# Patient Record
Sex: FEMALE | Race: WHITE | HISPANIC OR LATINO | Employment: UNEMPLOYED | URBAN - METROPOLITAN AREA
[De-identification: names, ages, dates, MRNs, and addresses within clinical notes are randomized per-mention and may not be internally consistent; named-entity substitution may affect disease eponyms.]

---

## 2017-03-15 ENCOUNTER — TELEPHONE (OUTPATIENT)
Dept: OBGYN | Facility: CLINIC | Age: 27
End: 2017-03-15

## 2020-03-12 ENCOUNTER — HOSPITAL ENCOUNTER (EMERGENCY)
Facility: MEDICAL CENTER | Age: 30
End: 2020-03-12
Attending: EMERGENCY MEDICINE
Payer: OTHER GOVERNMENT

## 2020-03-12 ENCOUNTER — APPOINTMENT (OUTPATIENT)
Dept: RADIOLOGY | Facility: MEDICAL CENTER | Age: 30
End: 2020-03-12
Attending: EMERGENCY MEDICINE
Payer: OTHER GOVERNMENT

## 2020-03-12 VITALS
HEIGHT: 63 IN | SYSTOLIC BLOOD PRESSURE: 131 MMHG | WEIGHT: 191.14 LBS | OXYGEN SATURATION: 96 % | TEMPERATURE: 97.2 F | RESPIRATION RATE: 18 BRPM | DIASTOLIC BLOOD PRESSURE: 90 MMHG | BODY MASS INDEX: 33.87 KG/M2 | HEART RATE: 92 BPM

## 2020-03-12 DIAGNOSIS — J18.9 ATYPICAL PNEUMONIA: ICD-10-CM

## 2020-03-12 DIAGNOSIS — J06.9 UPPER RESPIRATORY TRACT INFECTION, UNSPECIFIED TYPE: ICD-10-CM

## 2020-03-12 LAB
C PNEUM DNA SPEC QL NAA+PROBE: NOT DETECTED
FLUAV H1 2009 PAND RNA SPEC QL NAA+PROBE: NOT DETECTED
FLUAV H1 RNA SPEC QL NAA+PROBE: NOT DETECTED
FLUAV H3 RNA SPEC QL NAA+PROBE: NOT DETECTED
FLUAV RNA SPEC QL NAA+PROBE: NEGATIVE
FLUAV RNA SPEC QL NAA+PROBE: NOT DETECTED
FLUBV RNA SPEC QL NAA+PROBE: NEGATIVE
FLUBV RNA SPEC QL NAA+PROBE: NOT DETECTED
HADV DNA SPEC QL NAA+PROBE: NOT DETECTED
HCOV RNA SPEC QL NAA+PROBE: NOT DETECTED
HMPV RNA SPEC QL NAA+PROBE: NOT DETECTED
HPIV1 RNA SPEC QL NAA+PROBE: NOT DETECTED
HPIV2 RNA SPEC QL NAA+PROBE: NOT DETECTED
HPIV3 RNA SPEC QL NAA+PROBE: NOT DETECTED
HPIV4 RNA SPEC QL NAA+PROBE: NOT DETECTED
M PNEUMO DNA SPEC QL NAA+PROBE: NOT DETECTED
RSV A RNA SPEC QL NAA+PROBE: NOT DETECTED
RSV B RNA SPEC QL NAA+PROBE: NOT DETECTED
RV+EV RNA SPEC QL NAA+PROBE: NOT DETECTED

## 2020-03-12 PROCEDURE — 87633 RESP VIRUS 12-25 TARGETS: CPT

## 2020-03-12 PROCEDURE — 700111 HCHG RX REV CODE 636 W/ 250 OVERRIDE (IP): Performed by: EMERGENCY MEDICINE

## 2020-03-12 PROCEDURE — 71045 X-RAY EXAM CHEST 1 VIEW: CPT

## 2020-03-12 PROCEDURE — 99284 EMERGENCY DEPT VISIT MOD MDM: CPT

## 2020-03-12 PROCEDURE — 87486 CHLMYD PNEUM DNA AMP PROBE: CPT

## 2020-03-12 PROCEDURE — 87502 INFLUENZA DNA AMP PROBE: CPT | Mod: XU

## 2020-03-12 PROCEDURE — 87581 M.PNEUMON DNA AMP PROBE: CPT

## 2020-03-12 RX ORDER — DOXYCYCLINE 100 MG/1
100 CAPSULE ORAL 2 TIMES DAILY
Qty: 20 CAP | Refills: 0 | Status: SHIPPED | OUTPATIENT
Start: 2020-03-12 | End: 2020-03-22

## 2020-03-12 RX ORDER — DEXAMETHASONE SODIUM PHOSPHATE 10 MG/ML
10 INJECTION, SOLUTION INTRAMUSCULAR; INTRAVENOUS ONCE
Status: COMPLETED | OUTPATIENT
Start: 2020-03-12 | End: 2020-03-12

## 2020-03-12 RX ADMIN — DEXAMETHASONE SODIUM PHOSPHATE 10 MG: 10 INJECTION INTRAMUSCULAR; INTRAVENOUS at 11:47

## 2020-03-12 NOTE — ED NOTES
Pt Given discharge instructions/ prescriptions/ home care instructions, Pt verbalized understanding of instructions given, pt ambulatory to [facility].

## 2020-03-12 NOTE — ED NOTES
"ED Positive Culture Follow-up/Notification Note:    Date: 3/12/2020     Patient seen in the ED on 3/12/2020 for flu like symptoms of fever, sore throat, and cough x 3 weeks. Denies known COVID contacts but has a boyfriend that is a  to [facility].  1. Upper respiratory tract infection, unspecified type    2. Atypical pneumonia       Discharge Medication List as of 3/12/2020 12:08 PM      START taking these medications    Details   doxycycline (MONODOX) 100 MG capsule Take 1 Cap by mouth 2 times a day for 10 days., Disp-20 Cap, R-0, Print Rx Paper             Allergies: Patient has no known allergies.     Vitals:    03/12/20 0955 03/12/20 0956 03/12/20 1148   BP: 139/86  131/90   Pulse: 85  92   Resp: 16  18   Temp: 36.6 °C (97.9 °F)  36.2 °C (97.2 °F)   TempSrc: Oral  Temporal   SpO2: 95%  96%   Weight:  86.7 kg (191 lb 2.2 oz)    Height: 1.6 m (5' 3\")         Final cultures:   Results     Procedure Component Value Units Date/Time    Influenza A/B By PCR (Adult - Flu Only) [[id number]] Collected:  03/12/20 1023    Order Status:  Completed Specimen:  Respirate from Nasopharyngeal Updated:  03/12/20 1108     Influenza virus A RNA Negative     Influenza virus B, PCR Negative    Influenza A/B By PCR (Adult - Flu Only) [[id number]] Collected:  03/12/20 1023    Order Status:  Canceled Specimen:  Respirate from Nasopharyngeal       Results for REYES, YESSICA (MRN [id number]) as of 3/12/2020 13:45   3/12/2020 10:23   Adenovirus, PCR Not Detected   Chlamydia pneumoniae, PCR Not Detected   Coronavirus, PCR Not Detected   Human Metapneumovirus, PCR Not Detected   Influenza A 2009, H1N1, PCR Not Detected   Influenza virus A RNA Not Detected   Influenza virus B, PCR Not Detected   Influenza virus A H1 RNA Not Detected   Influenza virus A H3 RNA Not Detected   Mycoplasma pneumoniae, PCR Not Detected   Parainfluenza 4, PCR Not Detected   Parainfluenza virus 1, PCR Not Detected   Parainfluenza virus 2, PCR Not " Detected   Parainfluenza virus 3, PCR Not Detected   Resp Syncytial Virus A, PCR Not Detected   Resp Syncytial Virus B, PCR Not Detected   Rhinovirus / Enterovirus, PCR Not Detected       Plan:   Influenza and Respiratory PCR panel negative. I have informed the patient of the negative results and need to continue to quarantine until directed by the Health Dept.  I have contacted Infection Prevention to inform them of the negative results. They will contact the Health Dept for further COVID-19 evaluation.       Jeanie Sher, PharmD

## 2020-03-12 NOTE — ED PROVIDER NOTES
ED Provider Note    CHIEF COMPLAINT  Chief Complaint   Patient presents with   • Flu Like Symptoms       HPI  Yessica Reyes is a 29 y.o. female who presents who presents with chief complaint of flulike symptoms.  Patient reports fever sore throat and cough for the last 3 weeks.  Patient reports a fever of 103 last night, patient has not taken any antipyretics today.  Patient works at [employer] and the [employer].  She has not left the state or come into contact with any known COVID-19 contacts.  Patient denies any other major medical problems, she denies any history of diabetes or high blood pressure.  Patient denies any associated nausea or vomiting.  Patient has not left the area but her boyfriend does work as a  and drives to [place] relatively regularly.  He has not had a fever symptoms aside from mild runny nose.    REVIEW OF SYSTEMS  ROS  See HPI for further details. All other systems are negative.     PAST MEDICAL HISTORY       SOCIAL HISTORY  Social History     Tobacco Use   • Smoking status: Never Smoker   Substance and Sexual Activity   • Alcohol use: No   • Drug use: No   • Sexual activity: Yes     Partners: Male       SURGICAL HISTORY  patient denies any surgical history    CURRENT MEDICATIONS  Home Medications     Reviewed by Schuyler B Collett, R.N. (Registered Nurse) on 03/12/20 at 1000  Med List Status: <None>   Medication Last Dose Status   hydrocodone-acetaminophen (VICODIN ES) 7.5-750 MG per tablet  Active                ALLERGIES  No Known Allergies    PHYSICAL EXAM  Physical Exam   Constitutional: She is oriented to person, place, and time. She appears well-developed and well-nourished.   HENT:   Head: Normocephalic and atraumatic.   no trismus, no facial swelling, no floor of mouth swelling or submandibular fullness, no stridor. No pharyngeal asymmetry. Nl phonation     Eyes: Conjunctivae are normal.   Neck: Normal range of motion. Neck supple.   Cardiovascular: Normal rate and regular rhythm.    Pulmonary/Chest: Effort normal and breath sounds normal.   Abdominal: Soft. Bowel sounds are normal. She exhibits no distension. There is no abdominal tenderness. There is no rebound.   Neurological: She is alert and oriented to person, place, and time.   Skin: Skin is warm and dry. No rash noted.   Psychiatric: She has a normal mood and affect. Her behavior is normal.     Results for orders placed or performed during the hospital encounter of 03/12/20   Influenza A/B By PCR (Adult - Flu Only)   Result Value Ref Range    Influenza virus A RNA Negative Negative    Influenza virus B, PCR Negative Negative         DX-CHEST-PORTABLE (1 VIEW)   Final Result      No evidence of acute cardiopulmonary process.          COURSE & MEDICAL DECISION MAKING  Pertinent Labs & Imaging studies reviewed. (See chart for details)    Patient here with symptoms consistent with possible influenza versus pneumonia.   Patient x-ray and flu is negative.  I do believe this could be an atypical pneumonia especially given the duration will treat with doxycycline for this.  Patient without any lower extremity edema, she has no crackles on exam, she has no chest pain or considerable decreased exertional capacity to suggest vascular etiology.  Per [facility] triage workflow, patient does not qualify for any further testing at this point regarding Covid 19, however her boyfriend does travel regularly to [place] and therefore a viral panel was sent and will be followed up by pharmacy.  Patient is flu negative.  She understands that if she has ongoing concerns regarding this to call the Covid information line.  Patient instructed to self quarantine until symptoms have resolved.    Patient examined with N95, goggles and gloves    The patient will return for worsening symptoms and is stable at the time of discharge. The patient verbalizes understanding and will comply.    FINAL IMPRESSION    1. Upper respiratory tract infection, unspecified type     2. Atypical pneumonia               Electronically signed by: Deyvi Richards M.D., 3/12/2020 10:04 AM

## 2020-03-12 NOTE — ED TRIAGE NOTES
Yessica Reyes presents to triage reporting fever, cough, sore throat x 3 weeks. Pt denies any recent travel outside the state. 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     Mask placed on pt at doorway. Pt speaking in full sentences, NAD noted. Pt educated of triage process, placed back in Senior waiting area pending room assignment.

## 2020-03-12 NOTE — DISCHARGE INSTRUCTIONS
Since you have had symptoms for this long that are not improved and you are having fevers we will treat you with an antibiotic for atypical pneumonia.  At this point you do not qualify for testing for COVID but we have sent off a viral panel and if this is negative we will contact you with further instructions. We remain with a low suspicion that this is a coronavirus related issue.  Nevertheless we are asking everybody with respiratory symptoms to stay home until symptoms have resolved.  Call the Covid information line [phone] if you have any further concerns regarding COVID

## 2020-03-14 LAB
SARS-COV-2 N GENE SPEC QL NAA N1: NOT DETECTED
SARS-COV-2 N GENE SPEC QL NAA N2: NOT DETECTED
SARS-COV-2 RNA RESP QL NAA+PROBE: NOT DETECTED
SPECIMEN SOURCE: NORMAL

## 2020-03-19 NOTE — ED NOTES
COVID-19 Test Follow Up 3/19/2020      Results for REYES, YESSICA (MRN [id number]) as of 3/19/2020 09:18   Ref. Range 3/12/2020 10:23   2019-nCoV RNA Interp Unknown Not detected   2019-nCoV Source Unknown NP   2019-nCoV_N1 Unknown Not detected   2019-nCoV_N2 Unknown Not detected       Patient tested negative for COVID-19. Attempted to inform patient of result, but there was no answer. Left a message to call for results. If she returns call will discuss standard precautions. Informed there is no need for further self-quarantine unless otherwise directed by the Health Dept.     Jeanie Sher, PharmD    3/19/2020 1032 Patient returned call and had been informed of results.

## 2021-12-24 ENCOUNTER — HOSPITAL ENCOUNTER (EMERGENCY)
Facility: MEDICAL CENTER | Age: 31
End: 2021-12-24
Attending: EMERGENCY MEDICINE

## 2021-12-24 VITALS
BODY MASS INDEX: 38.25 KG/M2 | TEMPERATURE: 98.7 F | DIASTOLIC BLOOD PRESSURE: 83 MMHG | SYSTOLIC BLOOD PRESSURE: 131 MMHG | HEART RATE: 100 BPM | OXYGEN SATURATION: 95 % | WEIGHT: 202.6 LBS | HEIGHT: 61 IN | RESPIRATION RATE: 18 BRPM

## 2021-12-24 DIAGNOSIS — J06.9 VIRAL URI WITH COUGH: ICD-10-CM

## 2021-12-24 LAB
SARS-COV-2 RNA RESP QL NAA+PROBE: NOTDETECTED
SPECIMEN SOURCE: NORMAL

## 2021-12-24 PROCEDURE — U0005 INFEC AGEN DETEC AMPLI PROBE: HCPCS

## 2021-12-24 PROCEDURE — 99283 EMERGENCY DEPT VISIT LOW MDM: CPT | Mod: EDC

## 2021-12-24 PROCEDURE — U0003 INFECTIOUS AGENT DETECTION BY NUCLEIC ACID (DNA OR RNA); SEVERE ACUTE RESPIRATORY SYNDROME CORONAVIRUS 2 (SARS-COV-2) (CORONAVIRUS DISEASE [COVID-19]), AMPLIFIED PROBE TECHNIQUE, MAKING USE OF HIGH THROUGHPUT TECHNOLOGIES AS DESCRIBED BY CMS-2020-01-R: HCPCS

## 2021-12-24 NOTE — ED NOTES
"Discharge instructions given to guardian re.   1. Viral URI with cough       Discussed importance of follow up and monitoring at home.    Advised to follow up with [address]  [place] [address] [phone]  Schedule an appointment as soon as possible for a visit in 2 days    Advised to return to ER if new or worsening symptoms present.  Guardian verbalized an understanding of the instructions presented, all questioned answered.      Discharge paperwork signed and a copy was give to pt/parent.   Pt awake, alert, and NAD.    /83   Pulse 100   Temp 37.1 °C (98.7 °F) (Temporal)   Resp 18   Ht 1.56 m (5' 1.42\")   Wt 91.9 kg (202 lb 9.6 oz)   LMP 12/20/2021   SpO2 95%   BMI 37.76 kg/m²      "

## 2021-12-24 NOTE — ED TRIAGE NOTES
"Chief Complaint   Patient presents with   • Cough     w/congestion x2 days   • Headache     x2 days   • Sore Throat     x2 days; redness noted     Son currently being seen in [facility]. Patient alert and oriented. Skin PWD. No apparent distress.     /73   Pulse 99   Temp 37.2 °C (99 °F) (Temporal)   Resp 18   Ht 1.56 m (5' 1.42\")   Wt 91.9 kg (202 lb 9.6 oz)   LMP 12/20/2021   SpO2 93%   BMI 37.76 kg/m²     Patient medicated at home with tylenol @1000        "

## 2021-12-24 NOTE — ED PROVIDER NOTES
ED Provider Note    CHIEF COMPLAINT  No chief complaint on file.  Cough    HPI  Hilariasica Reyes is a 31 y.o. female who presents to the emergency department with several days of cough and cold symptoms.  The patient is a runny nose, congestion, cough, headache, achiness and shortness of breath with exertion.  Her child is here in the emergency department similar symptoms.  Is been present for last 3 days.  Patient denies any chest pain or significant comorbidities.  She has been vaccinated for COVID.  Denies any other acute concerns    REVIEW OF SYSTEMS  See HPI for further details. All other systems are negative.    PAST MEDICAL HISTORY  No past medical history on file.    FAMILY HISTORY  Family History   Problem Relation Age of Onset   • Diabetes Paternal Grandmother    • Diabetes Paternal Grandfather        SOCIAL HISTORY  Social History     Socioeconomic History   • Marital status: Single     Spouse name: Not on file   • Number of children: Not on file   • Years of education: Not on file   • Highest education level: Not on file   Occupational History   • Not on file   Tobacco Use   • Smoking status: Never Smoker   • Smokeless tobacco: Not on file   Substance and Sexual Activity   • Alcohol use: No   • Drug use: No   • Sexual activity: Yes     Partners: Male   Other Topics Concern   • Not on file   Social History Narrative   • Not on file     Social Determinants of Health     Financial Resource Strain:    • Difficulty of Paying Living Expenses: Not on file   Food Insecurity:    • Worried About Running Out of Food in the Last Year: Not on file   • Ran Out of Food in the Last Year: Not on file   Transportation Needs:    • Lack of Transportation (Medical): Not on file   • Lack of Transportation (Non-Medical): Not on file   Physical Activity:    • Days of Exercise per Week: Not on file   • Minutes of Exercise per Session: Not on file   Stress:    • Feeling of Stress : Not on file   Social Connections:    • Frequency of  "Communication with Friends and Family: Not on file   • Frequency of Social Gatherings with Friends and Family: Not on file   • Attends Mandaen Services: Not on file   • Active Member of Clubs or Organizations: Not on file   • Attends Club or Organization Meetings: Not on file   • Marital Status: Not on file   Intimate Partner Violence:    • Fear of Current or Ex-Partner: Not on file   • Emotionally Abused: Not on file   • Physically Abused: Not on file   • Sexually Abused: Not on file   Housing Stability:    • Unable to Pay for Housing in the Last Year: Not on file   • Number of Places Lived in the Last Year: Not on file   • Unstable Housing in the Last Year: Not on file       SURGICAL HISTORY  No past surgical history on file.    CURRENT MEDICATIONS  Home Medications     Reviewed by Lakesha Viera R.N. (Registered Nurse) on 12/24/21 at 1401  Med List Status: Partial   Medication Last Dose Status   hydrocodone-acetaminophen (VICODIN ES) 7.5-750 MG per tablet  Active                ALLERGIES  No Known Allergies    PHYSICAL EXAM  VITAL SIGNS: /73   Pulse 99   Temp 37.2 °C (99 °F) (Temporal)   Resp 18   Ht 1.56 m (5' 1.42\")   Wt 91.9 kg (202 lb 9.6 oz)   LMP 12/20/2021   SpO2 93%   BMI 37.76 kg/m²      Constitutional: Well developed, Well nourished, No acute distress, Non-toxic appearance.   HENT: Normocephalic, Atraumatic, swollen nasal mucosa.  Eyes: PERRL, EOMI, Conjunctiva normal, No discharge.   Neck: Normal range of motion   Cardiovascular: Normal heart rate, Normal rhythm, No murmurs, No rubs, No gallops.   Thorax & Lungs: Normal breath sounds, No respiratory distress, No wheezing  Musculoskeletal: Good range of motion in all major joints.  Neurologic: Alert,  No focal deficits noted.   Psychiatric: Affect normal,      COURSE & MEDICAL DECISION MAKING  Pertinent Labs & Imaging studies reviewed. (See chart for details)    The patient presents with suspected COVID-19 infection.  She does have " a clinical appearance of a viral infection.  Her vital signs are reassuring.  She complains of dyspnea on exertion to get ambulatory pulse ox.  This is normal she can be discharged home with return precautions for viral URI.    We will test her for COVID-19 infection.  She is advised to self isolate until she knows results.  She must self isolate for 10 days if she is positive.  She should return for worsening cough, shortness of breath fever or other concerns.  Questions are answered, she is agreeable with the plan.    The patient does not have any risk factors for severe infection at this time she is immunized.      [address] [address] [phone]  Schedule an appointment as soon as possible for a visit in 2 days            FINAL IMPRESSION  1. Viral URI with cough         2.   3.         Electronically signed by: Pedro Pablo Faith M.D., 12/24/2021 1:57 PM

## 2021-12-24 NOTE — DISCHARGE INSTRUCTIONS
Rest, Tylenol or ibuprofen for pain and fever. return for worsening cough, shortness of breath, fever or other concerns.  Log onto [employer] to check your COVID-19 test results.  If you are positive you must isolate for 10 days.

## 2022-06-09 ENCOUNTER — HOSPITAL ENCOUNTER (EMERGENCY)
Facility: MEDICAL CENTER | Age: 32
End: 2022-06-09
Attending: EMERGENCY MEDICINE

## 2022-06-09 ENCOUNTER — APPOINTMENT (OUTPATIENT)
Dept: RADIOLOGY | Facility: MEDICAL CENTER | Age: 32
End: 2022-06-09

## 2022-06-09 VITALS
DIASTOLIC BLOOD PRESSURE: 70 MMHG | OXYGEN SATURATION: 96 % | SYSTOLIC BLOOD PRESSURE: 109 MMHG | HEART RATE: 89 BPM | TEMPERATURE: 98.1 F | WEIGHT: 200.62 LBS | BODY MASS INDEX: 37.39 KG/M2 | RESPIRATION RATE: 20 BRPM

## 2022-06-09 DIAGNOSIS — R51.9 NONINTRACTABLE HEADACHE, UNSPECIFIED CHRONICITY PATTERN, UNSPECIFIED HEADACHE TYPE: ICD-10-CM

## 2022-06-09 DIAGNOSIS — R60.9 DEPENDENT EDEMA: ICD-10-CM

## 2022-06-09 DIAGNOSIS — R07.9 CHEST PAIN, UNSPECIFIED TYPE: ICD-10-CM

## 2022-06-09 LAB
ALBUMIN SERPL BCP-MCNC: 4.3 G/DL (ref 3.2–4.9)
ALBUMIN/GLOB SERPL: 1.4 G/DL
ALP SERPL-CCNC: 89 U/L (ref 30–99)
ALT SERPL-CCNC: 73 U/L (ref 2–50)
ANION GAP SERPL CALC-SCNC: 13 MMOL/L (ref 7–16)
AST SERPL-CCNC: 137 U/L (ref 12–45)
BASOPHILS # BLD AUTO: 0.3 % (ref 0–1.8)
BASOPHILS # BLD: 0.03 K/UL (ref 0–0.12)
BILIRUB SERPL-MCNC: 0.7 MG/DL (ref 0.1–1.5)
BUN SERPL-MCNC: 5 MG/DL (ref 8–22)
CALCIUM SERPL-MCNC: 8.4 MG/DL (ref 8.5–10.5)
CHLORIDE SERPL-SCNC: 103 MMOL/L (ref 96–112)
CO2 SERPL-SCNC: 22 MMOL/L (ref 20–33)
CREAT SERPL-MCNC: 0.4 MG/DL (ref 0.5–1.4)
EKG IMPRESSION: NORMAL
EOSINOPHIL # BLD AUTO: 0.22 K/UL (ref 0–0.51)
EOSINOPHIL NFR BLD: 2.3 % (ref 0–6.9)
ERYTHROCYTE [DISTWIDTH] IN BLOOD BY AUTOMATED COUNT: 40 FL (ref 35.9–50)
GFR SERPLBLD CREATININE-BSD FMLA CKD-EPI: 136 ML/MIN/1.73 M 2
GLOBULIN SER CALC-MCNC: 3 G/DL (ref 1.9–3.5)
GLUCOSE SERPL-MCNC: 117 MG/DL (ref 65–99)
HCT VFR BLD AUTO: 42.4 % (ref 37–47)
HGB BLD-MCNC: 15 G/DL (ref 12–16)
IMM GRANULOCYTES # BLD AUTO: 0.09 K/UL (ref 0–0.11)
IMM GRANULOCYTES NFR BLD AUTO: 0.9 % (ref 0–0.9)
LYMPHOCYTES # BLD AUTO: 1.18 K/UL (ref 1–4.8)
LYMPHOCYTES NFR BLD: 12.2 % (ref 22–41)
MCH RBC QN AUTO: 30.2 PG (ref 27–33)
MCHC RBC AUTO-ENTMCNC: 35.4 G/DL (ref 33.6–35)
MCV RBC AUTO: 85.5 FL (ref 81.4–97.8)
MONOCYTES # BLD AUTO: 0.51 K/UL (ref 0–0.85)
MONOCYTES NFR BLD AUTO: 5.3 % (ref 0–13.4)
NEUTROPHILS # BLD AUTO: 7.63 K/UL (ref 2–7.15)
NEUTROPHILS NFR BLD: 79 % (ref 44–72)
NRBC # BLD AUTO: 0 K/UL
NRBC BLD-RTO: 0 /100 WBC
PLATELET # BLD AUTO: 244 K/UL (ref 164–446)
PMV BLD AUTO: 10.6 FL (ref 9–12.9)
POTASSIUM SERPL-SCNC: 3.8 MMOL/L (ref 3.6–5.5)
PROT SERPL-MCNC: 7.3 G/DL (ref 6–8.2)
RBC # BLD AUTO: 4.96 M/UL (ref 4.2–5.4)
SODIUM SERPL-SCNC: 138 MMOL/L (ref 135–145)
TROPONIN T SERPL-MCNC: <6 NG/L (ref 6–19)
WBC # BLD AUTO: 9.7 K/UL (ref 4.8–10.8)

## 2022-06-09 PROCEDURE — 80053 COMPREHEN METABOLIC PANEL: CPT

## 2022-06-09 PROCEDURE — 96372 THER/PROPH/DIAG INJ SC/IM: CPT

## 2022-06-09 PROCEDURE — 700111 HCHG RX REV CODE 636 W/ 250 OVERRIDE (IP): Performed by: EMERGENCY MEDICINE

## 2022-06-09 PROCEDURE — 93005 ELECTROCARDIOGRAM TRACING: CPT

## 2022-06-09 PROCEDURE — 85025 COMPLETE CBC W/AUTO DIFF WBC: CPT

## 2022-06-09 PROCEDURE — 84484 ASSAY OF TROPONIN QUANT: CPT

## 2022-06-09 PROCEDURE — 36415 COLL VENOUS BLD VENIPUNCTURE: CPT

## 2022-06-09 PROCEDURE — 93005 ELECTROCARDIOGRAM TRACING: CPT | Performed by: EMERGENCY MEDICINE

## 2022-06-09 PROCEDURE — 99284 EMERGENCY DEPT VISIT MOD MDM: CPT

## 2022-06-09 PROCEDURE — 71045 X-RAY EXAM CHEST 1 VIEW: CPT

## 2022-06-09 RX ORDER — KETOROLAC TROMETHAMINE 30 MG/ML
15 INJECTION, SOLUTION INTRAMUSCULAR; INTRAVENOUS ONCE
Status: COMPLETED | OUTPATIENT
Start: 2022-06-09 | End: 2022-06-09

## 2022-06-09 RX ADMIN — KETOROLAC TROMETHAMINE 15 MG: 30 INJECTION, SOLUTION INTRAMUSCULAR at 22:17

## 2022-06-10 NOTE — ED NOTES
Pt aox4, vss, nad, ambulatory steady  Pt understood all dc info and when to seek medical care, no further questions

## 2022-06-10 NOTE — ED NOTES
Pt to [address] via steady ambulation. Pt is A&Ox4 and awake in bed. Pt placed on pulse ox and automatic BP. VSS, saturating above 95% on RA, HR in the 90s. Call light within reach and pt updated on POC.

## 2022-06-10 NOTE — ED TRIAGE NOTES
Pt ambulatory to triage c/o HA x 1 week without relief with otc medications. Pt also c/o intermittent c/p and leg swelling. Denies sob. Nad. Denies fevers

## 2022-06-10 NOTE — ED PROVIDER NOTES
ED Provider Note    Scribed for Miguel De La Paz M.D. by Zully Cuevas. 6/9/2022  8:40 PM    Primary care provider: Pcp Pt States None  Means of arrival: Walk-in  History obtained from: Patient   History limited by: None     CHIEF COMPLAINT  Chief Complaint   Patient presents with   • Headache   • Leg Swelling   • Chest Pain       HPI  Yessica Reyes is a 31 y.o. female who presents to the Emergency Department for evaluation of intermittent chest pain onset yesterday night prior to arrival. Patient states that she was walking when she first felt her symptoms. She states that she went to bed and the pain never went away. Patient also complains of intermittent headaches and swollen feet onset 1 week ago. She notes that she is a , so she is on her feet all the time at work. She reports that she notices the swelling usually after work.  Swelling tends to improve when she wakes up in the mornings.  Patient denies being diagnosed with migraines. She also states that nobody in her family has migraines. She adds that she has been taking over the counter pain medications almost everyday for her headaches, but has felt no relief. She also reports drinking caffeine about 3 times a week. She admits to associated symptoms of shortness of breath, headaches, bilateral feet swelling, but denies numbness, fever, trouble sleeping, or cough.     REVIEW OF SYSTEMS  Pertinent positives include intermittent chest pain, shortness of breath, headaches, bilateral feet swelling. Pertinent negatives include no numbness, fever, trouble sleeping, or cough.  All other systems reviewed and negative.    PAST MEDICAL HISTORY       SURGICAL HISTORY  patient denies any surgical history    SOCIAL HISTORY  Social History     Tobacco Use   • Smoking status: Current Every Day Smoker     Types: Cigarettes   • Smokeless tobacco: Never Used   Substance Use Topics   • Alcohol use: No   • Drug use: No      Social History     Substance and Sexual Activity    Drug Use No       FAMILY HISTORY  Family History   Problem Relation Age of Onset   • Diabetes Paternal Grandmother    • Diabetes Paternal Grandfather        CURRENT MEDICATIONS  Home Medications     Reviewed by Kary Ng R.N. (Registered Nurse) on 06/09/22 at 1909  Med List Status: Partial   Medication Last Dose Status   hydrocodone-acetaminophen (VICODIN ES) 7.5-750 MG per tablet  Active                ALLERGIES  No Known Allergies    PHYSICAL EXAM  VITAL SIGNS: /78   Pulse 93   Temp 36.6 °C (97.8 °F) (Temporal)   Resp 18   Wt 91 kg (200 lb 9.9 oz)   LMP 05/10/2022   SpO2 97%   BMI 37.39 kg/m²     Pulse ox interpretation: Normal   Constitutional: No acute distress, Non-toxic appearance.   HENT: Normocephalic, Atraumatic  Eyes: PERRLA, EOMI, Conjunctiva normal, No discharge.   Neck: Normal range of motion, No tenderness, Supple, No stridor.   Lymphatic: No lymphadenopathy noted.   Cardiovascular: Normal heart rate, Normal rhythm  Thorax & Lungs: Normal breath sounds, No respiratory distress, No wheezing, No chest tenderness.   Abdomen: Bowel sounds normal, Soft, No tenderness, No masses, No pulsatile masses.   Skin: Warm, Dry, No erythema, No rash.   Back: No tenderness, No CVA tenderness.   Extremities: Intact distal pulses, No edema, No tenderness, No cyanosis, No clubbing.   Musculoskeletal: Good range of motion in all major joints. No tenderness to palpation or major deformities noted.   Neurologic: Alert, Normal motor function, Normal sensory function, No focal deficits noted.   Psychiatric: Affect normal, Judgment normal, Mood normal.       LABS  Labs Reviewed   CBC WITH DIFFERENTIAL - Abnormal; Notable for the following components:       Result Value    MCHC 35.4 (*)     Neutrophils-Polys 79.00 (*)     Lymphocytes 12.20 (*)     Neutrophils (Absolute) 7.63 (*)     All other components within normal limits   COMP METABOLIC PANEL - Abnormal; Notable for the following components:    Glucose  117 (*)     Bun 5 (*)     Creatinine 0.40 (*)     Calcium 8.4 (*)     AST(SGOT) 137 (*)     ALT(SGPT) 73 (*)     All other components within normal limits   TROPONIN   ESTIMATED GFR     All labs reviewed by me.    EKG  Results for orders placed or performed during the hospital encounter of 22   EKG   Result Value Ref Range    Report       [facility] Emergency Dept.    Test Date:  2022  Pt Name:    YESSICA REYES                Department: ER  MRN:        [id number]                      Room:  Gender:     Female                       Technician: [id number]  :        1990                   Requested By:ER TRIAGE PROTOCOL  Order #:    [id number]                    Reading MD: ALCIDES RAMIREZ MD    Measurements  Intervals                                Axis  Rate:       90                           P:          29  IL:         138                          QRS:        25  QRSD:       86                           T:          25  QT:         372  QTc:        455    Interpretive Statements  Sinus rhythm  No previous ECG available for comparison  Electronically Signed On 2022 20:37:03 PDT by ALCIDES RAMIREZ MD           RADIOLOGY  DX-CHEST-PORTABLE (1 VIEW)   Final Result      No evidence of acute cardiopulmonary process.        The radiologist's interpretation of all radiological studies have been reviewed by me.    COURSE & MEDICAL DECISION MAKING  Pertinent Labs & Imaging studies reviewed. (See chart for details)    8:40 PM - Patient seen and examined at bedside. Ordered DX chest, CBC w/diff, CMP, troponin, and EKG to evaluate her symptoms. Discussed plan of care with patient. I informed them that labs and imaging will be ordered to evaluate symptoms. Patient is understanding and agreeable with plan.    10:07 PM - I reevaluated the patient at bedside. The patient informs me they feel improved. I discussed the patient's diagnostic study results. I discussed plan for discharge and follow up as  outlined below. The patient is stable for discharge at this time and will return for any new or worsening symptoms. Patient verbalizes understanding and support with my plan for discharge.        Decision Making:  This is a 31 y.o. year old female who presents with intermittent headache, chest pain, lower extremity swelling.  She has been taking acetaminophen and ibuprofen for headaches for the past week rather consistently.  She notes that when she does not take this medication headache returns and seems to be worse.  No active vomiting.  No focal neurologic deficits.  If symptoms do not improve despite overcoming the withdrawal of analgesic rebound headaches, recommending outpatient follow-up with the neurologist on-call.    Additionally, patient notes some chest discomfort starting the past day.  In her upper chest.  No vomiting or diaphoresis.  No cough or shortness of breath.  Pulmonary embolism rule out criteria negative.  Low suspicion for dissection given the patient's symptomatology.  Chest x-ray is unremarkable.  Patient has a heart score of 0.    With regards to lower extremity swelling, swelling is equal bilaterally.  Low suspicion for DVT.  Tends to improve with elevation at night.  Symptoms are most consistent with dependent edema given her work setting where she is on her feet much of the day.  Normal renal function.  Has slightly elevated liver enzymes and no significant liver disease history with a normal albumin and protein no evidence of heart failure..    The patient will return for new or worsening symptoms and is stable at the time of discharge. Patient was given return precautions. Patient and/or family member verbalizes understanding and will comply.    DISPOSITION:  Patient will be discharged home in stable condition.    FOLLOW UP:  [facility], Emergency Dept  [address] [address] [phone]    As needed, If symptoms worsen    Primary care  doctor    Schedule an appointment as soon as possible for a visit           OUTPATIENT MEDICATIONS:  Discharge Medication List as of 6/9/2022  9:58 PM          FINAL IMPRESSION  1. Nonintractable headache, unspecified chronicity pattern, unspecified headache type    2. Dependent edema    3. Chest pain, unspecified type         This dictation has been created using voice recognition software and/or scribes. The accuracy of the dictation is limited by the abilities of the software and the expertise of the scribes. I expect there may be some errors of grammar and possibly content. I made every attempt to manually correct the errors within my dictation. However, errors related to voice recognition software and/or scribes may still exist and should be interpreted within the appropriate context.     Zully SILVA (Scribe), am scribing for, and in the presence of, Miguel De La Paz M.D..    Electronically signed by: Zully Cuevas (Scribe), 6/9/2022    Miguel SILVA M.D. personally performed the services described in this documentation, as scribed by Zully Cuevas in my presence, and it is both accurate and complete. C.     The note accurately reflects work and decisions made by me.  Miguel De La Paz M.D.  6/10/2022  2:35 AM

## 2022-09-03 ENCOUNTER — APPOINTMENT (OUTPATIENT)
Dept: RADIOLOGY | Facility: MEDICAL CENTER | Age: 32
End: 2022-09-03
Attending: EMERGENCY MEDICINE

## 2022-09-03 ENCOUNTER — HOSPITAL ENCOUNTER (EMERGENCY)
Facility: MEDICAL CENTER | Age: 32
End: 2022-09-03
Attending: EMERGENCY MEDICINE

## 2022-09-03 VITALS
WEIGHT: 190.7 LBS | BODY MASS INDEX: 33.79 KG/M2 | RESPIRATION RATE: 17 BRPM | DIASTOLIC BLOOD PRESSURE: 58 MMHG | SYSTOLIC BLOOD PRESSURE: 105 MMHG | TEMPERATURE: 97.4 F | HEART RATE: 72 BPM | OXYGEN SATURATION: 95 % | HEIGHT: 63 IN

## 2022-09-03 DIAGNOSIS — R10.33 PERIUMBILICAL ABDOMINAL PAIN: ICD-10-CM

## 2022-09-03 DIAGNOSIS — R19.7 DIARRHEA, UNSPECIFIED TYPE: ICD-10-CM

## 2022-09-03 LAB
ALBUMIN SERPL BCP-MCNC: 3.8 G/DL (ref 3.2–4.9)
ALBUMIN/GLOB SERPL: 1.1 G/DL
ALP SERPL-CCNC: 116 U/L (ref 30–99)
ALT SERPL-CCNC: 21 U/L (ref 2–50)
ANION GAP SERPL CALC-SCNC: 12 MMOL/L (ref 7–16)
APPEARANCE UR: CLEAR
AST SERPL-CCNC: 29 U/L (ref 12–45)
BACTERIA #/AREA URNS HPF: NEGATIVE /HPF
BASOPHILS # BLD AUTO: 0.6 % (ref 0–1.8)
BASOPHILS # BLD: 0.06 K/UL (ref 0–0.12)
BILIRUB SERPL-MCNC: 0.5 MG/DL (ref 0.1–1.5)
BILIRUB UR QL STRIP.AUTO: NEGATIVE
BUN SERPL-MCNC: 7 MG/DL (ref 8–22)
CALCIUM SERPL-MCNC: 9 MG/DL (ref 8.5–10.5)
CHLORIDE SERPL-SCNC: 101 MMOL/L (ref 96–112)
CO2 SERPL-SCNC: 21 MMOL/L (ref 20–33)
COLOR UR: ABNORMAL
CREAT SERPL-MCNC: 0.47 MG/DL (ref 0.5–1.4)
EOSINOPHIL # BLD AUTO: 0.46 K/UL (ref 0–0.51)
EOSINOPHIL NFR BLD: 4.5 % (ref 0–6.9)
EPI CELLS #/AREA URNS HPF: ABNORMAL /HPF
ERYTHROCYTE [DISTWIDTH] IN BLOOD BY AUTOMATED COUNT: 36.8 FL (ref 35.9–50)
GFR SERPLBLD CREATININE-BSD FMLA CKD-EPI: 130 ML/MIN/1.73 M 2
GLOBULIN SER CALC-MCNC: 3.5 G/DL (ref 1.9–3.5)
GLUCOSE SERPL-MCNC: 107 MG/DL (ref 65–99)
GLUCOSE UR STRIP.AUTO-MCNC: NEGATIVE MG/DL
HCG SERPL QL: NEGATIVE
HCT VFR BLD AUTO: 42.1 % (ref 37–47)
HGB BLD-MCNC: 15 G/DL (ref 12–16)
HYALINE CASTS #/AREA URNS LPF: ABNORMAL /LPF
IMM GRANULOCYTES # BLD AUTO: 0.1 K/UL (ref 0–0.11)
IMM GRANULOCYTES NFR BLD AUTO: 1 % (ref 0–0.9)
KETONES UR STRIP.AUTO-MCNC: ABNORMAL MG/DL
LEUKOCYTE ESTERASE UR QL STRIP.AUTO: ABNORMAL
LIPASE SERPL-CCNC: 183 U/L (ref 11–82)
LYMPHOCYTES # BLD AUTO: 2.11 K/UL (ref 1–4.8)
LYMPHOCYTES NFR BLD: 20.6 % (ref 22–41)
MCH RBC QN AUTO: 30.2 PG (ref 27–33)
MCHC RBC AUTO-ENTMCNC: 35.6 G/DL (ref 33.6–35)
MCV RBC AUTO: 84.9 FL (ref 81.4–97.8)
MICRO URNS: ABNORMAL
MONOCYTES # BLD AUTO: 0.79 K/UL (ref 0–0.85)
MONOCYTES NFR BLD AUTO: 7.7 % (ref 0–13.4)
NEUTROPHILS # BLD AUTO: 6.74 K/UL (ref 2–7.15)
NEUTROPHILS NFR BLD: 65.6 % (ref 44–72)
NITRITE UR QL STRIP.AUTO: NEGATIVE
NRBC # BLD AUTO: 0 K/UL
NRBC BLD-RTO: 0 /100 WBC
PH UR STRIP.AUTO: 5.5 [PH] (ref 5–8)
PLATELET # BLD AUTO: 374 K/UL (ref 164–446)
PMV BLD AUTO: 9.9 FL (ref 9–12.9)
POTASSIUM SERPL-SCNC: 4 MMOL/L (ref 3.6–5.5)
PROT SERPL-MCNC: 7.3 G/DL (ref 6–8.2)
PROT UR QL STRIP: NEGATIVE MG/DL
RBC # BLD AUTO: 4.96 M/UL (ref 4.2–5.4)
RBC # URNS HPF: ABNORMAL /HPF
RBC UR QL AUTO: NEGATIVE
SODIUM SERPL-SCNC: 134 MMOL/L (ref 135–145)
SP GR UR STRIP.AUTO: 1.03
UROBILINOGEN UR STRIP.AUTO-MCNC: 0.2 MG/DL
WBC # BLD AUTO: 10.3 K/UL (ref 4.8–10.8)
WBC #/AREA URNS HPF: ABNORMAL /HPF

## 2022-09-03 PROCEDURE — 700111 HCHG RX REV CODE 636 W/ 250 OVERRIDE (IP): Performed by: EMERGENCY MEDICINE

## 2022-09-03 PROCEDURE — 700105 HCHG RX REV CODE 258: Performed by: EMERGENCY MEDICINE

## 2022-09-03 PROCEDURE — 99284 EMERGENCY DEPT VISIT MOD MDM: CPT

## 2022-09-03 PROCEDURE — 83690 ASSAY OF LIPASE: CPT

## 2022-09-03 PROCEDURE — 96372 THER/PROPH/DIAG INJ SC/IM: CPT

## 2022-09-03 PROCEDURE — 80053 COMPREHEN METABOLIC PANEL: CPT

## 2022-09-03 PROCEDURE — 76705 ECHO EXAM OF ABDOMEN: CPT

## 2022-09-03 PROCEDURE — 85025 COMPLETE CBC W/AUTO DIFF WBC: CPT

## 2022-09-03 PROCEDURE — 36415 COLL VENOUS BLD VENIPUNCTURE: CPT

## 2022-09-03 PROCEDURE — 81001 URINALYSIS AUTO W/SCOPE: CPT

## 2022-09-03 PROCEDURE — 74018 RADEX ABDOMEN 1 VIEW: CPT

## 2022-09-03 PROCEDURE — 84703 CHORIONIC GONADOTROPIN ASSAY: CPT

## 2022-09-03 RX ORDER — DICYCLOMINE HYDROCHLORIDE 10 MG/ML
20 INJECTION INTRAMUSCULAR ONCE
Status: COMPLETED | OUTPATIENT
Start: 2022-09-03 | End: 2022-09-03

## 2022-09-03 RX ORDER — SODIUM CHLORIDE 9 MG/ML
1000 INJECTION, SOLUTION INTRAVENOUS ONCE
Status: COMPLETED | OUTPATIENT
Start: 2022-09-03 | End: 2022-09-03

## 2022-09-03 RX ORDER — DICYCLOMINE HCL 20 MG
20 TABLET ORAL EVERY 6 HOURS
Qty: 20 TABLET | Refills: 0 | Status: SHIPPED | OUTPATIENT
Start: 2022-09-03

## 2022-09-03 RX ADMIN — DICYCLOMINE HYDROCHLORIDE 20 MG: 20 INJECTION, SOLUTION INTRAMUSCULAR at 12:58

## 2022-09-03 RX ADMIN — SODIUM CHLORIDE 1000 ML: 9 INJECTION, SOLUTION INTRAVENOUS at 12:56

## 2022-09-03 ASSESSMENT — LIFESTYLE VARIABLES
TOTAL SCORE: 0
CONSUMPTION TOTAL: NEGATIVE
EVER HAD A DRINK FIRST THING IN THE MORNING TO STEADY YOUR NERVES TO GET RID OF A HANGOVER: NO
DO YOU DRINK ALCOHOL: NO
TOTAL SCORE: 0
AVERAGE NUMBER OF DAYS PER WEEK YOU HAVE A DRINK CONTAINING ALCOHOL: 0
TOTAL SCORE: 0
HAVE YOU EVER FELT YOU SHOULD CUT DOWN ON YOUR DRINKING: NO
ON A TYPICAL DAY WHEN YOU DRINK ALCOHOL HOW MANY DRINKS DO YOU HAVE: 0
HAVE PEOPLE ANNOYED YOU BY CRITICIZING YOUR DRINKING: NO
EVER FELT BAD OR GUILTY ABOUT YOUR DRINKING: NO
HOW MANY TIMES IN THE PAST YEAR HAVE YOU HAD 5 OR MORE DRINKS IN A DAY: 0

## 2022-09-03 ASSESSMENT — FIBROSIS 4 INDEX: FIB4 SCORE: 2.04

## 2022-09-03 NOTE — ED TRIAGE NOTES
Ambulates to triage  Chief Complaint   Patient presents with    Abdominal Pain     Since Tuesday    Diarrhea     Since Tuesday, had n/v at the beginning, but now just abd pain and diarrhea, denies any blood in her vomit.      Also had a headache for a couple days, but has now resolved.  Took 2 home covid tests, they were both negative.

## 2022-09-03 NOTE — DISCHARGE INSTRUCTIONS
Adhere to a full liquid diet for the next 24 hours and then progress to solid foods from there.  Return immediately with an increase of your abdominal pain, fever or any other concerns

## 2022-09-03 NOTE — ED NOTES
CT ABDOMEN AND PELVIS NONCONTRAST:

 

12/20/2018

 

HISTORY:

Left lower quadrant abdominal pain.

 

COMPARISON:

04/08/2015

 

FINDINGS:

There is minimal atelectasis present at the right lung base.  The lung bases are otherwise clear.

 

A subcentimeter hypodense lesion is seen in the right hepatic lobe, which measures 9 mm, but this is 
larger in size compared to the post contrasted study in 2015, when this measured 4 mm.  This cannot b
e further characterized on this nonenhanced CT scan exam.

 

The spleen, pancreas, bilateral adrenal glands, kidneys, decompressed urinary bladder, uterus, and ad
nexal structures demonstrate a grossly normal nonenhanced CT appearance.

 

The appendix is visualized and is normal in caliber.  A small amount of retained fecal material is se
en within the ascending and transverse colon.

 

No free fluid, fluid collection, or lymphadenopathy is seen in the abdomen or pelvis.

 

The previously described soft tissue mass, in an infraumbilical location, anterior to the left rectus
 abdominis muscle, but to the left of midline, is again seen.  This mass-like structure is larger in 
size and transverse dimensions, previously measuring 1.9 cm and now measuring 2.4 cm.  As noted on th
e prior exam, this could be related to an area of scar and possibly post surgical in origin.  However
, a soft tissue neoplastic process cannot be entirely excluded.  There is pseudoarticulation of the l
ateral masses of L5 with S1.  The osseous structures otherwise have a normal CT appearance.

 

IMPRESSION:

1.  As noted on the prior examination, there is a soft tissue mass-like lesion just anterior to the l
ower left rectus abdominis muscle, just to the left of midline, which is larger in size.  This could 
be related to an area of scarring, but a soft tissue neoplastic process cannot be entirely excluded. 
 A biopsy could be performed if indicated.

 

2.  Subcentimeter, too-small-to-characterize, hypodense lesion in the right hepatic lobe.

 

3.  No CT evidence of appendicitis.

 

4.  No acute findings within the abdomen or pelvis.

 

5.  Findings discussed with Dr. Josue in the emergency department on 12/20/18 at 0852 hours.

 

CODE T

 

POS: [facility] Pt ambulated to bathroom, UA cup provided.

## 2022-09-03 NOTE — ED NOTES
Discharge teaching and paperwork provided regarding abdominal pain and all questions/concerns answered. VSS, PIV removed. Given information regarding home care and reasons to follow up with ED or primary MD. Patient provided bentyl Rx. Patient discharged to the care of friend and ambulated out of the ED.

## 2022-09-03 NOTE — ED PROVIDER NOTES
"ED Provider Note    CHIEF COMPLAINT  Chief Complaint   Patient presents with    Abdominal Pain     Since Tuesday    Diarrhea     Since Tuesday, had n/v at the beginning, but now just abd pain and diarrhea, denies any blood in her vomit.        HPI  Yessica Reyes-Huerta is a 32 y.o. female who presents for evaluation of abdominal pain and diarrhea.  This all began about 5 days ago, on the initial day of illness she had some nausea and vomiting associated with it.  Since then its just the diarrhea.  Abdominal pain is located in the middle of her abdomen.  Nonradiating and worse after eating.  There is been no blood in her stool.  She denies any history of this and states she has no medical problems.  No fever.  No chest pain.  No shortness of breath.  No other acute complaints offered by the patient at this time.    REVIEW OF SYSTEMS  Negative for fever, rash, chest pain, dyspnea, headache, back pain. All other systems are negative.     PAST MEDICAL HISTORY  History reviewed. No pertinent past medical history.    FAMILY HISTORY  Family History   Problem Relation Age of Onset    Diabetes Paternal Grandmother     Diabetes Paternal Grandfather        SOCIAL HISTORY  Social History     Tobacco Use    Smoking status: Every Day     Types: Cigarettes    Smokeless tobacco: Never   Substance Use Topics    Alcohol use: No     Comment: occ    Drug use: No       SURGICAL HISTORY  History reviewed. No pertinent surgical history.    CURRENT MEDICATIONS  I personally reviewed the medication list in the charting documentation.     ALLERGIES  No Known Allergies    MEDICAL RECORD  I have reviewed patient's medical record and pertinent results are listed above.      PHYSICAL EXAM  VITAL SIGNS: /78   Pulse 84   Temp 36.2 °C (97.1 °F) (Temporal)   Resp 17   Ht 1.6 m (5' 3\")   Wt 86.5 kg (190 lb 11.2 oz)   LMP 08/10/2022 (Exact Date)   SpO2 96%   BMI 33.78 kg/m²    Constitutional: Well appearing patient in no acute distress.  " Awake and alert, not toxic nor ill in appearance.  HENT: Normocephalic, no obvious evidence of acute trauma.  Eyes: No scleral icterus. Normal conjunctiva   Neck: Comfortable movement without any obvious restriction in the range of motion.  Cardiovascular: Upon ascultation I appreciate a regular heart rhythm and a normal rate.   Thorax & Lungs: Normal nonlabored respirations.  Upon application of the stethoscope for auscultation I find there to be no associated chest wall tenderness.  I appreciate no wheezing, rhonchi or rales. There is normal air movement.    Abdomen: The abdomen is not visibly distended.  Upon palpation has mild supraumbilical tenderness, no rebound, no guarding.  Skin: The exposed portions of skin reveal no obvious rash or other abnormalities.  Neurologic: Alert & oriented. No focal deficits observed.   Psychiatric: Normal affect appropriate for the clinical situation.    DIAGNOSTIC STUDIES / PROCEDURES    LABS/EKGs     Results for orders placed or performed during the hospital encounter of 09/03/22   CBC WITH DIFFERENTIAL   Result Value Ref Range    WBC 10.3 4.8 - 10.8 K/uL    RBC 4.96 4.20 - 5.40 M/uL    Hemoglobin 15.0 12.0 - 16.0 g/dL    Hematocrit 42.1 37.0 - 47.0 %    MCV 84.9 81.4 - 97.8 fL    MCH 30.2 27.0 - 33.0 pg    MCHC 35.6 (H) 33.6 - 35.0 g/dL    RDW 36.8 35.9 - 50.0 fL    Platelet Count 374 164 - 446 K/uL    MPV 9.9 9.0 - 12.9 fL    Neutrophils-Polys 65.60 44.00 - 72.00 %    Lymphocytes 20.60 (L) 22.00 - 41.00 %    Monocytes 7.70 0.00 - 13.40 %    Eosinophils 4.50 0.00 - 6.90 %    Basophils 0.60 0.00 - 1.80 %    Immature Granulocytes 1.00 (H) 0.00 - 0.90 %    Nucleated RBC 0.00 /100 WBC    Neutrophils (Absolute) 6.74 2.00 - 7.15 K/uL    Lymphs (Absolute) 2.11 1.00 - 4.80 K/uL    Monos (Absolute) 0.79 0.00 - 0.85 K/uL    Eos (Absolute) 0.46 0.00 - 0.51 K/uL    Baso (Absolute) 0.06 0.00 - 0.12 K/uL    Immature Granulocytes (abs) 0.10 0.00 - 0.11 K/uL    NRBC (Absolute) 0.00 K/uL    COMP METABOLIC PANEL   Result Value Ref Range    Sodium 134 (L) 135 - 145 mmol/L    Potassium 4.0 3.6 - 5.5 mmol/L    Chloride 101 96 - 112 mmol/L    Co2 21 20 - 33 mmol/L    Anion Gap 12.0 7.0 - 16.0    Glucose 107 (H) 65 - 99 mg/dL    Bun 7 (L) 8 - 22 mg/dL    Creatinine 0.47 (L) 0.50 - 1.40 mg/dL    Calcium 9.0 8.5 - 10.5 mg/dL    AST(SGOT) 29 12 - 45 U/L    ALT(SGPT) 21 2 - 50 U/L    Alkaline Phosphatase 116 (H) 30 - 99 U/L    Total Bilirubin 0.5 0.1 - 1.5 mg/dL    Albumin 3.8 3.2 - 4.9 g/dL    Total Protein 7.3 6.0 - 8.2 g/dL    Globulin 3.5 1.9 - 3.5 g/dL    A-G Ratio 1.1 g/dL   LIPASE   Result Value Ref Range    Lipase 183 (H) 11 - 82 U/L   HCG QUAL SERUM   Result Value Ref Range    Beta-Hcg Qualitative Serum Negative Negative   URINALYSIS,CULTURE IF INDICATED    Specimen: Urine   Result Value Ref Range    Color DK Yellow     Character Clear     Specific Gravity 1.026 <1.035    Ph 5.5 5.0 - 8.0    Glucose Negative Negative mg/dL    Ketones Trace (A) Negative mg/dL    Protein Negative Negative mg/dL    Bilirubin Negative Negative    Urobilinogen, Urine 0.2 Negative    Nitrite Negative Negative    Leukocyte Esterase Small (A) Negative    Occult Blood Negative Negative    Micro Urine Req Microscopic    ESTIMATED GFR   Result Value Ref Range    GFR (CKD-EPI) 130 >60 mL/min/1.73 m 2   URINE MICROSCOPIC (W/UA)   Result Value Ref Range    WBC 0-2 /hpf    RBC 0-2 /hpf    Bacteria Negative None /hpf    Epithelial Cells Few /hpf    Hyaline Cast 3-5 (A) /lpf        RADIOLOGY     US-RUQ   Final Result      1.  No acute sonographic abnormality. No gallstones.   2.  Increased hepatic echogenicity, suggestive of steatosis and/or hepatocellular disease.      [id number] VIEW   Final Result      1.  No evidence of bowel obstruction.   2.  Possible LEFT kidney stone.            COURSE & MEDICAL DECISION MAKING  I have reviewed any medical record information, laboratory studies and radiographic results as noted  above.  Differential diagnoses includes: Dehydration, electrolyte abnormalities, anemia, pancreatitis, hepatitis, biliary obstruction, bowel obstruction    Encounter Summary: This is a very pleasant 32 y.o. female who unfortunately required evaluation in the emergency department today with frequent diarrhea and mid abdominal discomfort, no longer has vomiting, has no peritonitis on exam does localize mild tenderness.  Blood work will be obtained.  Will administer IV fluids and IM Bentyl.  KUB will be obtained and she will be reevaluated ------ patient is reevaluated and feeling significantly improved.  She did have an elevation in her lipase and alkaline phosphatase albeit mildly, an ultrasound of her right upper quadrant is obtained which is unremarkable.  No indication of gallbladder pathology.  Some hepatocellular disease versus hepatic steatosis is appreciated.  At this point she will adhere to a liquid diet over the next 24 hours.  I will prescribe Bentyl.  She will advance her diet from there.  Discharged home in stable condition with strict return instructions provided      DISPOSITION: Discharged home in stable condition      FINAL IMPRESSION  1. Periumbilical abdominal pain    2. Diarrhea, unspecified type           This dictation was created using voice recognition software. The accuracy of the dictation is limited to the abilities of the software. I expect there may be some errors of grammar and possibly content. The nursing notes were reviewed and certain aspects of this information were incorporated into this note.    Electronically signed by: Jason Lancaster M.D., 9/3/2022 12:22 PM

## 2023-03-24 ENCOUNTER — HOSPITAL ENCOUNTER (OUTPATIENT)
Dept: RADIOLOGY | Facility: MEDICAL CENTER | Age: 33
End: 2023-03-24
Attending: STUDENT IN AN ORGANIZED HEALTH CARE EDUCATION/TRAINING PROGRAM

## 2023-03-24 DIAGNOSIS — R74.01 TRANSAMINITIS: ICD-10-CM

## 2024-02-20 ENCOUNTER — HOSPITAL ENCOUNTER (EMERGENCY)
Facility: MEDICAL CENTER | Age: 34
End: 2024-02-20
Attending: EMERGENCY MEDICINE

## 2024-02-20 VITALS
HEIGHT: 62 IN | WEIGHT: 214.73 LBS | DIASTOLIC BLOOD PRESSURE: 78 MMHG | OXYGEN SATURATION: 96 % | SYSTOLIC BLOOD PRESSURE: 136 MMHG | BODY MASS INDEX: 39.51 KG/M2 | HEART RATE: 96 BPM | RESPIRATION RATE: 18 BRPM | TEMPERATURE: 97.4 F

## 2024-02-20 DIAGNOSIS — R68.89 FLU-LIKE SYMPTOMS: ICD-10-CM

## 2024-02-20 DIAGNOSIS — J06.9 UPPER RESPIRATORY TRACT INFECTION, UNSPECIFIED TYPE: Primary | ICD-10-CM

## 2024-02-20 LAB
FLUAV RNA SPEC QL NAA+PROBE: NEGATIVE
FLUBV RNA SPEC QL NAA+PROBE: NEGATIVE
RSV RNA SPEC QL NAA+PROBE: NEGATIVE
SARS-COV-2 RNA RESP QL NAA+PROBE: NOTDETECTED

## 2024-02-20 PROCEDURE — A9270 NON-COVERED ITEM OR SERVICE: HCPCS | Performed by: EMERGENCY MEDICINE

## 2024-02-20 PROCEDURE — 700102 HCHG RX REV CODE 250 W/ 637 OVERRIDE(OP): Performed by: EMERGENCY MEDICINE

## 2024-02-20 PROCEDURE — 99284 EMERGENCY DEPT VISIT MOD MDM: CPT

## 2024-02-20 PROCEDURE — 0241U HCHG SARS-COV-2 COVID-19 NFCT DS RESP RNA 4 TRGT ED POC: CPT

## 2024-02-20 RX ORDER — BENZONATATE 100 MG/1
100 CAPSULE ORAL 3 TIMES DAILY PRN
Status: DISCONTINUED | OUTPATIENT
Start: 2024-02-20 | End: 2024-02-20 | Stop reason: HOSPADM

## 2024-02-20 RX ORDER — ACETAMINOPHEN 500 MG
1000 TABLET ORAL EVERY 6 HOURS PRN
Qty: 20 TABLET | Refills: 0 | Status: SHIPPED | OUTPATIENT
Start: 2024-02-20 | End: 2024-02-24

## 2024-02-20 RX ORDER — IBUPROFEN 800 MG/1
800 TABLET ORAL ONCE
Status: COMPLETED | OUTPATIENT
Start: 2024-02-20 | End: 2024-02-20

## 2024-02-20 RX ORDER — ACETAMINOPHEN 500 MG
1000 TABLET ORAL ONCE
Status: COMPLETED | OUTPATIENT
Start: 2024-02-20 | End: 2024-02-20

## 2024-02-20 RX ORDER — BENZONATATE 100 MG/1
100 CAPSULE ORAL 3 TIMES DAILY PRN
Qty: 15 CAPSULE | Refills: 0 | Status: SHIPPED | OUTPATIENT
Start: 2024-02-20

## 2024-02-20 RX ORDER — IBUPROFEN 800 MG/1
800 TABLET ORAL EVERY 8 HOURS PRN
Qty: 9 TABLET | Refills: 0 | Status: SHIPPED | OUTPATIENT
Start: 2024-02-20 | End: 2024-02-23

## 2024-02-20 RX ADMIN — IBUPROFEN 800 MG: 800 TABLET, FILM COATED ORAL at 05:39

## 2024-02-20 RX ADMIN — ACETAMINOPHEN 1000 MG: 500 TABLET ORAL at 05:38

## 2024-02-20 ASSESSMENT — FIBROSIS 4 INDEX: FIB4 SCORE: 0.54

## 2024-02-20 NOTE — ED NOTES
"Pt discharged home. Pt in possession of belongings. Pt provided discharge education and information pertaining to medications and follow up appointments. Pt received copy of discharge instructions and verbalized understanding. /78   Pulse 96   Temp 36.3 °C (97.4 °F) (Temporal)   Resp 18   Ht 1.575 m (5' 2\")   Wt 97.4 kg (214 lb 11.7 oz)   SpO2 96%   BMI 39.27 kg/m²     "

## 2024-02-20 NOTE — ED TRIAGE NOTES
Chief Complaint   Patient presents with    Cough     Sore throat and cough since Saturday. Described as a productive cough.     Ear Pain     Bilateral ear pain started yesterday.      Vitals:    02/20/24 0514   BP: 135/78   Pulse: 97   Resp: 13   Temp: 36.3 °C (97.4 °F)   SpO2: 97%     Pt ambulatory to triage w/ above complaint.   Denies any medical history. Covid protocol ordered. Swab collected and running.   Placed pt back in lobby, educated on NPO status.

## 2024-02-20 NOTE — ED PROVIDER NOTES
ED Provider Note    Scribed for Kali Garcia by Augustin Bruce. 2/20/2024  5:25 AM    Primary care provider: Pcp Pt States None  Means of arrival: walk in  History obtained from: Patient  History limited by: None    CHIEF COMPLAINT  Chief Complaint   Patient presents with    Cough     Sore throat and cough since Saturday. Described as a productive cough.     Ear Pain     Bilateral ear pain started yesterday.      EXTERNAL RECORDS REVIEWED  External ED Note Seen at [facility] in 2016 for headache    HPI/ROS    LIMITATION TO HISTORY   Select: : None    HPI  Hilariasica Reyes-Huerta is a 33 y.o. female who presents to the Emergency Department for flu like symptoms onset 3 days ago. She states that she began to have cough, sore throat, fatigue and ear pain. These symptoms have been worsening over the past few days. She denies any dysuria or fever. She has been taking NyQuil for her symptoms. She denies any recent sick contacts. She denies any chance of pregnancy. The patient denies any major medical history. They deny any usage of cigarettes, drugs or alcohol.     REVIEW OF SYSTEMS  As above, all other systems reviewed and are negative.   See HPI for further details.     PAST MEDICAL HISTORY     SURGICAL HISTORY  patient denies any surgical history  SOCIAL HISTORY  Social History     Tobacco Use    Smoking status: Every Day     Types: Cigarettes    Smokeless tobacco: Never   Substance Use Topics    Alcohol use: No     Comment: occ    Drug use: No      Social History     Substance and Sexual Activity   Drug Use No     FAMILY HISTORY  Family History   Problem Relation Age of Onset    Diabetes Paternal Grandmother     Diabetes Paternal Grandfather      CURRENT MEDICATIONS  Home Medications       Reviewed by Julissa Ovalle R.N. (Registered Nurse) on 02/20/24 at 0519  Med List Status: Partial     Medication Last Dose Status   dicyclomine (BENTYL) 20 MG Tab  Active                  ALLERGIES  No Known  "Allergies    PHYSICAL EXAM    VITAL SIGNS:   Vitals:    02/20/24 0514 02/20/24 0516 02/20/24 0540 02/20/24 0542   BP: 135/78  136/78 136/78   Pulse: 97  96 96   Resp: 13   18   Temp: 36.3 °C (97.4 °F)  36.3 °C (97.4 °F) 36.3 °C (97.4 °F)   TempSrc: Temporal  Temporal Temporal   SpO2: 97%  96% 96%   Weight:  97.4 kg (214 lb 11.7 oz)     Height:  1.575 m (5' 2\")       Vitals: My interpretation: normotensive, not tachycardic, afebrile, not hypoxic    Reinterpretation of vitals: Unchanged and unremarkable    PE:   Gen: sitting comfortably, speaking clearly, appears in no acute distress   ENT: Mucous membranes moist, posterior pharynx clear, uvula midline, nares patent bilaterally   Neck: Supple, FROM  Pulmonary: Lungs are clear to auscultation bilaterally. No tachypnea  CV:  RRR, no murmur appreciated, pulses 2+ in both upper and lower extremities  Abdomen: soft, NT/ND; no rebound/guarding  : no CVA or suprapubic tenderness   Neuro: A&Ox4 (person, place, time, situation), speech fluent, gait steady, no focal deficits appreciated  Skin: No rash or lesions.  No pallor or jaundice.  No cyanosis.  Warm and dry.     COURSE & MEDICAL DECISION MAKING  Nursing notes, VS, PMSFHx, labs, imaging, EKG reviewed in chart.    ED Observation Status? No; Patient does not meet criteria for ED Observation.     Ddx: Flu, COVID, RSV, pneumonia    MDM: 5:25 AM Yessica Reyes-Huerta is a 33 y.o. female who presented with evaluation of general flulike symptoms for the past few days including cough, sore throat, congestion, fatigue, ear pain.  She is healthy, takes no medications.  Denies fevers.  Arrives here with normal vital signs.  Physical exam is benign, lungs clear, ENT exam without infection.  Flu COVID RSV swab run but patient does not want to wait for results will check on MyChart for them at home.  She has benign exam and normal vital signs, she is given dose of Tylenol, Motrin, Tessalon Perles.  She will be discharged with a " prescription for the same.  She will return if she has any worsening symptoms.  Will follow-up with PCP for further evaluation and treatment.  Recommend copious fluids.  PCP referral was placed in chart for her.    ADDITIONAL PROBLEM LIST AND DISPOSITION    Discussion of management with other [facility] or appropriate source(s): None     Escalation of care considered, and ultimately not performed:the patient was evaluated by myself, after discussion I have recommended the patient to be discharged    Barriers to care at this time, including but not limited to: Patient does not have established PCP.     Decision tools and prescription drugs considered including, but not limited to: Pain Medications Tylenol and Motrin .    FINAL IMPRESSION  1. Upper respiratory tract infection, unspecified type Acute   2. Flu-like symptoms Acute       Augustin SILVA (Maria Luz), am scribing for, and in the presence of, Kali Garcia.    Electronically signed by: Augustin Bruce (Maria Luz), 2/20/2024    IKali personally performed the services described in this documentation, as scribed by Augustin Bruce in my presence, and it is both accurate and complete.    The note accurately reflects work and decisions made by me.  Kali Garcia  2/20/2024  5:54 AM

## 2024-02-20 NOTE — DISCHARGE INSTRUCTIONS
You can check MyChart later an hour or 2 and your COVID, flu and RSV test results will be up.  There is no treatment for this other than management of symptoms and I sent a prescription for Tylenol, Motrin and Tessalon Perles to help.  Drink plenty of fluids.  Take the medications as directed.  The nurse will give you a work note for today.  You tested negative, it is still likely you are suffering from a viral illness.  Follow-up with your primary team for further evaluation as needed.  Thank you for coming today.